# Patient Record
Sex: MALE | URBAN - METROPOLITAN AREA
[De-identification: names, ages, dates, MRNs, and addresses within clinical notes are randomized per-mention and may not be internally consistent; named-entity substitution may affect disease eponyms.]

---

## 2019-10-17 ENCOUNTER — APPOINTMENT (OUTPATIENT)
Dept: URBAN - METROPOLITAN AREA SURGERY 9 | Age: 79
Setting detail: DERMATOLOGY
End: 2019-10-17

## 2019-10-17 VITALS — HEART RATE: 56 BPM | RESPIRATION RATE: 16 BRPM | SYSTOLIC BLOOD PRESSURE: 120 MMHG | DIASTOLIC BLOOD PRESSURE: 60 MMHG

## 2019-10-17 PROBLEM — C44.319 BASAL CELL CARCINOMA OF SKIN OF OTHER PARTS OF FACE: Status: ACTIVE | Noted: 2019-10-17

## 2019-10-17 PROBLEM — C44.42 SQUAMOUS CELL CARCINOMA OF SKIN OF SCALP AND NECK: Status: ACTIVE | Noted: 2019-10-17

## 2019-10-17 PROCEDURE — 17311 MOHS 1 STAGE H/N/HF/G: CPT

## 2019-10-17 PROCEDURE — 17311 MOHS 1 STAGE H/N/HF/G: CPT | Mod: 76

## 2019-10-17 PROCEDURE — 17312 MOHS ADDL STAGE: CPT

## 2019-10-17 PROCEDURE — OTHER MOHS SURGERY: OTHER

## 2019-10-17 PROCEDURE — OTHER RETURN TO REFERRING PROVIDER: OTHER

## 2019-10-17 PROCEDURE — OTHER CONSULTATION FOR MOHS SURGERY: OTHER

## 2019-10-17 NOTE — PROCEDURE: CONSULTATION FOR MOHS SURGERY
Body Location Override (Optional - Billing Will Still Be Based On Selected Body Map Location If Applicable): right anterior temple
Incorporate Mauc In Note: No
Detail Level: Detailed
X Size Of Lesion In Cm (Optional): 0
Body Location Override (Optional - Billing Will Still Be Based On Selected Body Map Location If Applicable): superior occipital scalp

## 2019-10-17 NOTE — PROCEDURE: MOHS SURGERY
Body Location Override (Optional - Billing Will Still Be Based On Selected Body Map Location If Applicable): superior occipital scalp

## 2019-10-17 NOTE — HPI: MOHS SURGERY CONSULTATION (MULTIPLE LESIONS)
How Many Lesions Are To Be Evaluated?: 2
Is This A New Presentation, Or A Follow-Up?: Mohs Surgery Consultation
Who Referred The Patient?: Dr. Sin
What Is The Location Of The First Lesion?: Right anterior temple
What Is The Location Of The Second Lesion?: Superior occipital scalp

## 2019-10-17 NOTE — PROCEDURE: MOHS SURGERY
Body Location Override (Optional - Billing Will Still Be Based On Selected Body Map Location If Applicable): right anterior temple

## 2022-10-13 ENCOUNTER — APPOINTMENT (OUTPATIENT)
Dept: URBAN - METROPOLITAN AREA SURGERY 9 | Age: 82
Setting detail: DERMATOLOGY
End: 2022-10-13

## 2022-10-13 VITALS — DIASTOLIC BLOOD PRESSURE: 72 MMHG | SYSTOLIC BLOOD PRESSURE: 122 MMHG

## 2022-10-13 PROBLEM — C44.319 BASAL CELL CARCINOMA OF SKIN OF OTHER PARTS OF FACE: Status: ACTIVE | Noted: 2022-10-13

## 2022-10-13 PROCEDURE — 17311 MOHS 1 STAGE H/N/HF/G: CPT

## 2022-10-13 PROCEDURE — 17312 MOHS ADDL STAGE: CPT

## 2022-10-13 PROCEDURE — OTHER MOHS SURGERY: OTHER

## 2022-10-13 PROCEDURE — 12052 INTMD RPR FACE/MM 2.6-5.0 CM: CPT

## 2022-10-13 PROCEDURE — OTHER CONSULTATION FOR MOHS SURGERY: OTHER

## 2022-10-13 PROCEDURE — OTHER RETURN TO REFERRING PROVIDER: OTHER

## 2022-10-13 NOTE — PROCEDURE: MOHS SURGERY
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 Mitoses and atypical forms are evident. Intercellular bridge and keratin edwina formation are evident.

## 2022-10-13 NOTE — PROCEDURE: CONSULTATION FOR MOHS SURGERY
Detail Level: Detailed
Body Location Override (Optional - Billing Will Still Be Based On Selected Body Map Location If Applicable): right temple
X Size Of Lesion In Cm (Optional): 0
Incorporate Mauc In Note: No

## 2024-03-29 NOTE — PROCEDURE: MOHS SURGERY
0919 Pt arrived at [facility] ambulatory and in no distress for transfusion of 2 units PRBCs.  Assessment completed, no new complaints voiced.  #22g PIV established in right forearm without difficulty.  Signs/symptoms of adverse blood reaction discussed with pt, voiced understanding.    Patient Vitals for the past 24 hrs:   BP Temp Temp src Pulse Resp SpO2   03/29/24 1455 121/64 98.9 °F (37.2 °C) Temporal 64 16 100 %   03/29/24 1305 (!) 108/56 98.3 °F (36.8 °C) Temporal 57 16 100 %   03/29/24 1241 (!) 119/57 98.8 °F (37.1 °C) Temporal 56 16 100 %   03/29/24 1203 (!) 114/55 98.1 °F (36.7 °C) Temporal 66 16 --   03/29/24 1010 (!) 97/58 98.7 °F (37.1 °C) Temporal 62 16 100 %   03/29/24 0949 (!) 112/56 98.1 °F (36.7 °C) Temporal 71 16 100 %   03/29/24 0919 (!) 113/55 98 °F (36.7 °C) Temporal 100 18 100 %       Medications received:  NS @ KVO  Tylenol 650 mg PO  Benadryl 25 mg PO    0955:  1st unit PRBCs started and infusing without difficulty, observed x 15 minutes  1203:  1st unit completed without adverse reaction noted, NS flushing line.  1250:  2nd unit PRBCs started and infusing without difficulty, observed x 15 minutes  1455:  2nd unit completed without adverse reaction noted, NS flushing line.    1508 Tolerated transfusion well, no adverse reaction noted.  D/C instructions reviewed, copy to pt, voiced understanding. D/Cd from [facility] [place] in wheelchair and in no distress accompanied by daughter.  Next appt 4/3/24 @ 2190.    Dry/Warm Consent (Ear)/Introductory Paragraph: The rationale for Mohs was explained to the patient and consent was obtained. The risks, benefits and alternatives to therapy were discussed in detail. Specifically, the risks of ear deformity, infection, scarring, bleeding, prolonged wound healing, incomplete removal, allergy to anesthesia, nerve injury and recurrence were addressed. Prior to the procedure, the treatment site was clearly identified and confirmed by the patient. All components of Universal Protocol/PAUSE Rule completed.